# Patient Record
Sex: FEMALE | Race: OTHER | HISPANIC OR LATINO | URBAN - METROPOLITAN AREA
[De-identification: names, ages, dates, MRNs, and addresses within clinical notes are randomized per-mention and may not be internally consistent; named-entity substitution may affect disease eponyms.]

---

## 2019-02-13 ENCOUNTER — OUTPATIENT (OUTPATIENT)
Dept: OUTPATIENT SERVICES | Facility: HOSPITAL | Age: 54
LOS: 1 days | Discharge: HOME | End: 2019-02-13

## 2019-02-13 DIAGNOSIS — N63.10 UNSPECIFIED LUMP IN THE RIGHT BREAST, UNSPECIFIED QUADRANT: ICD-10-CM

## 2019-03-14 PROBLEM — Z00.00 ENCOUNTER FOR PREVENTIVE HEALTH EXAMINATION: Status: ACTIVE | Noted: 2019-03-14

## 2019-03-18 ENCOUNTER — APPOINTMENT (OUTPATIENT)
Dept: BREAST CENTER | Facility: CLINIC | Age: 54
End: 2019-03-18
Payer: COMMERCIAL

## 2019-03-18 VITALS
DIASTOLIC BLOOD PRESSURE: 88 MMHG | HEIGHT: 60 IN | WEIGHT: 172 LBS | SYSTOLIC BLOOD PRESSURE: 124 MMHG | BODY MASS INDEX: 33.77 KG/M2 | TEMPERATURE: 98.6 F

## 2019-03-18 DIAGNOSIS — Z80.1 FAMILY HISTORY OF MALIGNANT NEOPLASM OF TRACHEA, BRONCHUS AND LUNG: ICD-10-CM

## 2019-03-18 DIAGNOSIS — Z80.41 FAMILY HISTORY OF MALIGNANT NEOPLASM OF OVARY: ICD-10-CM

## 2019-03-18 DIAGNOSIS — Z78.9 OTHER SPECIFIED HEALTH STATUS: ICD-10-CM

## 2019-03-18 DIAGNOSIS — N63.20 UNSPECIFIED LUMP IN THE LEFT BREAST, UNSPECIFIED QUADRANT: ICD-10-CM

## 2019-03-18 PROCEDURE — 99203 OFFICE O/P NEW LOW 30 MIN: CPT

## 2019-03-27 NOTE — DATA REVIEWED
[FreeTextEntry1] : EXAM: US BREAST LIMITED LT \par EXAM: MG MAMMO DIAG W XIOMARA BI# \par \par \par PROCEDURE DATE: 02/13/2019 \par \par \par \par INTERPRETATION: Clinical indication/reason for exam: Patient presents with \par swelling of the upper quadrant of the left breast. \par \par The patient reports her last clinical breast examination was performed \par within the past month. \par \par Spot magnification imaging of the symptomatic area was performed in addition \par to routine mammographic projections. \par \par Computer-aided detection was utilized in the interpretation of this \par examination. \par \par No comparison, baseline examination. \par \par Breast composition: The breasts are almost entirely fatty. \par \par There are no suspicious masses, areas of architectural distortion or cluster \par of microcalcifications in either breast. The spot magnification views of the \par symptomatic area show no suspicious findings. \par \par Targeted Left Breast Sonogram: \par \par No solid/cystic lesions or areas of abnormal shadowing are seen. \par \par IMPRESSION: No mammographic or sonographic evidence of malignancy. \par \par No mammographic or sonographic correlate for the reported palpable \par abnormality in the left breast. The failure to confirm a lesion \par mammographically or sonographically should not deter biopsy if there is felt \par to be a clinically suspicious palpable abnormality. \par \par Recommendation: Any decision to biopsy the palpable abnormality should be \par based on the level of clinical concern. \par \par BI-RADS Category 1: Negative \par \par \par \par \par REMBERTO LANDAVERDE M.D., RESIDENT RADIOLOGIST \par This document has been electronically signed. \par LAXMI BLAIR M.D., ATTENDING RADIOLOGIST \par This document has been electronically signed. Feb 13 2019 3:32PM \par

## 2019-03-27 NOTE — HISTORY OF PRESENT ILLNESS
[FreeTextEntry1] : : Matilde\par \par Kalpana is a 54 post menopausal Chilean speaking F who presents with a left breast swelling. \par \par She noticed some swelling in her UOQ, but denies palpating any breast masses in either breast.  She has no breast related complaints at this time.  She denies any breast pain, has not palpated any new palpable masses in either breast and denies any nipple discharge.  She has b/l nipple inversion since she was young.  \par \par Her most recent imaging was performed on 19, a b/l mammogram and US which was unrevealing for any suspicious lesions in either breast.\par \par HISTORICAL RISK FACTORS: \par -sister with ovarian cancer, reportedly underwent genetic testing and was negative\par -no prior breast biopsies or surgeries \par -, age at first live birth was 20\par -no prior OCP use\par \par Of note, because of her family history of ovarian cancer in her sister, she is getting screened with pelvic US, per patient.

## 2019-03-27 NOTE — CONSULT LETTER
[Dear  ___] : Dear ~MAGALY, [Consult Letter:] : I had the pleasure of evaluating your patient, [unfilled]. [Please see my note below.] : Please see my note below. [Consult Closing:] : Thank you very much for allowing me to participate in the care of this patient.  If you have any questions, please do not hesitate to contact me. [Sincerely,] : Sincerely, [FreeTextEntry2] : Che Zavala CNM\par [address] [place] [address]\[place] [address] [FreeTextEntry3] : Carlene Cartagena MD \par Breast Surgical Oncologist\par [facility] [place]\par [facility]\par [facility]

## 2019-03-27 NOTE — PAST MEDICAL HISTORY
[Menarche Age ____] : age at menarche was [unfilled] [Menopause Age____] : age at menopause was [unfilled] [Total Preg ___] : G[unfilled] [Live Births ___] : P[unfilled]  [Age At Live Birth ___] : Age at live birth: [unfilled] [History of Hormone Replacement Treatment] : has no history of hormone replacement treatment [FreeTextEntry5] : denies  [FreeTextEntry6] : denies [FreeTextEntry7] : denies [FreeTextEntry8] : yes x 2 months

## 2019-03-27 NOTE — ASSESSMENT
[FreeTextEntry1] : Kalpana is a 54 F who presents with left breast swelling. \par \par There was no evidence of disease on exam today.  Her most recent imaging was a b/l dx mammogram and b/l breast US On 2/13/19 which was also unrevealing for any suspicious lesions in either breast. She will be due for her next imaging in 1 year.  This will be scheduled for her today.  I will have her follow up after her yearly screening mammogram on 2/13/2020.\par \par We discussed dense breasts.  Increasing breast density has been found to increase ones risk of breast cancer, but at this time, there is no clear indication for additional imaging in this setting, as both US and MRI have not been found to improve survival.  One can consider bilateral screening US.  However, out of 1000 women screened, the use of routine US will only identify an additional 3-5 cancers.  The use of US was found to increase the likelihood of undergoing more imaging and more biopsies.  She does NOT have dense breasts.  We have decided NOT to proceed with screening bilateral breast US at this time.  This will be scheduled with her next screening mammogram.\par \par She is otherwise at an average risk for breast cancer based off her history and should continue with annual screening mammograms.  \par \par All of her questions were answered.  She knows to call with any further questions or concerns. \par \par PLAN: \par -b/l screening mammogram on 2/13/2020\par -f/up after

## 2019-03-27 NOTE — REVIEW OF SYSTEMS
[As Noted in HPI] : as noted in HPI [Negative] : Heme/Lymph [Abn Vaginal Bleeding] : no unexplained vaginal bleeding [Skin Lesions] : no skin lesions [Skin Wound] : no skin wound [Breast Pain] : no breast pain [Breast Lump] : no breast lump

## 2019-03-27 NOTE — PHYSICAL EXAM
[Normocephalic] : normocephalic [Atraumatic] : atraumatic [EOMI] : extra ocular movement intact [No Supraclavicular Adenopathy] : no supraclavicular adenopathy [No Cervical Adenopathy] : no cervical adenopathy [Examined in the supine and seated position] : examined in the supine and seated position [No dominant masses] : no dominant masses in right breast  [No dominant masses] : no dominant masses left breast [No Nipple Discharge] : no left nipple discharge [No Axillary Lymphadenopathy] : no left axillary lymphadenopathy [Soft] : abdomen soft [Not Tender] : non-tender [No Edema] : no edema [No Rashes] : no rashes [No Ulceration] : no ulceration [de-identified] : b/l nipple inversion, but no suspicious masses were palpated in either breast

## 2019-04-01 ENCOUNTER — OUTPATIENT (OUTPATIENT)
Dept: OUTPATIENT SERVICES | Facility: HOSPITAL | Age: 54
LOS: 1 days | Discharge: HOME | End: 2019-04-01

## 2019-04-05 DIAGNOSIS — H90.8 MIXED CONDUCTIVE AND SENSORINEURAL HEARING LOSS, UNSPECIFIED: ICD-10-CM

## 2019-04-08 ENCOUNTER — LABORATORY RESULT (OUTPATIENT)
Age: 54
End: 2019-04-08

## 2019-04-09 ENCOUNTER — OUTPATIENT (OUTPATIENT)
Dept: OUTPATIENT SERVICES | Facility: HOSPITAL | Age: 54
LOS: 1 days | Discharge: HOME | End: 2019-04-09

## 2019-04-09 DIAGNOSIS — C18.9 MALIGNANT NEOPLASM OF COLON, UNSPECIFIED: ICD-10-CM

## 2019-05-03 ENCOUNTER — OUTPATIENT (OUTPATIENT)
Dept: OUTPATIENT SERVICES | Facility: HOSPITAL | Age: 54
LOS: 1 days | Discharge: HOME | End: 2019-05-03
Payer: SUBSIDIZED

## 2019-05-03 DIAGNOSIS — E83.52 HYPERCALCEMIA: ICD-10-CM

## 2019-05-03 PROCEDURE — 76536 US EXAM OF HEAD AND NECK: CPT | Mod: 26

## 2019-05-16 ENCOUNTER — APPOINTMENT (OUTPATIENT)
Dept: UROLOGY | Facility: CLINIC | Age: 54
End: 2019-05-16

## 2019-05-31 ENCOUNTER — APPOINTMENT (OUTPATIENT)
Dept: ENDOCRINOLOGY | Facility: CLINIC | Age: 54
End: 2019-05-31

## 2019-05-31 ENCOUNTER — OUTPATIENT (OUTPATIENT)
Dept: OUTPATIENT SERVICES | Facility: HOSPITAL | Age: 54
LOS: 1 days | Discharge: HOME | End: 2019-05-31

## 2019-05-31 VITALS — HEIGHT: 60 IN | WEIGHT: 169 LBS | BODY MASS INDEX: 33.18 KG/M2

## 2019-05-31 DIAGNOSIS — E83.52 HYPERCALCEMIA: ICD-10-CM

## 2019-05-31 NOTE — ASSESSMENT
[FreeTextEntry1] : US Thyroid + Parathyroid:  Impression: 1 cm nodule within the lower pole the left lobe of the thyroid.\par Recommendation: Follow-up in one years time suggested.\par \par 54 year old female with no significant medical history presents with concerns of hypercalcemia, sent by PMD.  Patient had an US of thyroid and parathyroid done and results are above. Physical exam benign with non-palpable thyroid. \par \par # Hypercalcemia, Asymptomatic\par - Calcium, cmp, intact PTH, PTHrP, 1,25 dihydroxyvitamin D, 25-OH vitamin D, serum phos, random urine calcium\par \par # 1 cm thyroid nodule\par - TSH\par \par \par Follow up in 3 months after blood work.

## 2019-05-31 NOTE — HISTORY OF PRESENT ILLNESS
[FreeTextEntry1] : 54 year old female with No significant PMHx, not on any medications presents here after being referred by PCP.  Patient is Wol[place] speaking only. Daughter at bedside. \par \par Patient was sent by Primary care Dr. Solange Lazar for "elevated serum calcium, slightly elevated PTH, asymptomatic".\par \par Patient has no complaints.  Denies taking otc calcium/ vitamin supplements

## 2019-05-31 NOTE — PHYSICAL EXAM
[Alert] : alert [No Acute Distress] : no acute distress [Well Nourished] : well nourished [Well Developed] : well developed [Normal Sclera/Conjunctiva] : normal sclera/conjunctiva [PERRL] : pupils equal, round and reactive to light [EOMI] : extra ocular movement intact [No Proptosis] : no proptosis [Normal Outer Ear/Nose] : the ears and nose were normal in appearance [Normal TMs] : both tympanic membranes were normal [Normal Hearing] : hearing was normal [Normal Nasal Mucosa] : the nasal mucosa was normal [No Neck Mass] : no neck mass was observed [Supple] : the neck was supple [No LAD] : no lymphadenopathy [Thyroid Not Enlarged] : the thyroid was not enlarged [No Respiratory Distress] : no respiratory distress [Normal Rate and Effort] : normal respiratory rhythm and effort [No Accessory Muscle Use] : no accessory muscle use [Clear to Auscultation] : lungs were clear to auscultation bilaterally [Normal Rate] : heart rate was normal  [Normal S1, S2] : normal S1 and S2 [Regular Rhythm] : with a regular rhythm [Carotids Normal] : carotid pulses were normal with no bruits [Normal Bowel Sounds] : normal bowel sounds [Not Tender] : non-tender [Soft] : abdomen soft [Not Distended] : not distended [Normal] : normal and non tender [No CVA Tenderness] : no ~M costovertebral angle tenderness [No Spinal Tenderness] : no spinal tenderness [Normal Gait] : normal gait [No Joint Swelling] : no joint swelling seen [No Clubbing, Cyanosis] : no clubbing  or cyanosis of the fingernails [Normal Strength/Tone] : muscle strength and tone were normal [No Rash] : no rash [Cranial Nerves Intact] : cranial nerves 2-12 were intact [Normal Reflexes] : deep tendon reflexes were 2+ and symmetric [Oriented x3] : oriented to person, place, and time [Normal Insight/Judgement] : insight and judgment were intact [Normal Affect] : the affect was normal [de-identified] : Thyroid non-palpable

## 2019-08-23 ENCOUNTER — APPOINTMENT (OUTPATIENT)
Dept: ENDOCRINOLOGY | Facility: CLINIC | Age: 54
End: 2019-08-23

## 2020-02-26 ENCOUNTER — APPOINTMENT (OUTPATIENT)
Dept: BREAST CENTER | Facility: CLINIC | Age: 55
End: 2020-02-26

## 2020-02-26 ENCOUNTER — FORM ENCOUNTER (OUTPATIENT)
Age: 55
End: 2020-02-26

## 2020-02-27 ENCOUNTER — OUTPATIENT (OUTPATIENT)
Dept: OUTPATIENT SERVICES | Facility: HOSPITAL | Age: 55
LOS: 1 days | Discharge: HOME | End: 2020-02-27
Payer: OTHER GOVERNMENT

## 2020-02-27 DIAGNOSIS — Z12.31 ENCOUNTER FOR SCREENING MAMMOGRAM FOR MALIGNANT NEOPLASM OF BREAST: ICD-10-CM

## 2020-02-27 PROCEDURE — 77067 SCR MAMMO BI INCL CAD: CPT | Mod: 26

## 2020-02-27 PROCEDURE — 77063 BREAST TOMOSYNTHESIS BI: CPT | Mod: 26

## 2020-03-19 ENCOUNTER — APPOINTMENT (OUTPATIENT)
Dept: BREAST CENTER | Facility: CLINIC | Age: 55
End: 2020-03-19